# Patient Record
Sex: FEMALE | URBAN - METROPOLITAN AREA
[De-identification: names, ages, dates, MRNs, and addresses within clinical notes are randomized per-mention and may not be internally consistent; named-entity substitution may affect disease eponyms.]

---

## 2017-01-19 ENCOUNTER — APPOINTMENT (RX ONLY)
Dept: URBAN - METROPOLITAN AREA CLINIC 51 | Facility: CLINIC | Age: 37
Setting detail: DERMATOLOGY
End: 2017-01-19

## 2017-01-19 DIAGNOSIS — L71.8 OTHER ROSACEA: ICD-10-CM

## 2017-01-19 DIAGNOSIS — L70.0 ACNE VULGARIS: ICD-10-CM

## 2017-01-19 DIAGNOSIS — B07.8 OTHER VIRAL WARTS: ICD-10-CM

## 2017-01-19 DIAGNOSIS — D485 NEOPLASM OF UNCERTAIN BEHAVIOR OF SKIN: ICD-10-CM

## 2017-01-19 DIAGNOSIS — L72.0 EPIDERMAL CYST: ICD-10-CM

## 2017-01-19 DIAGNOSIS — Z41.9 ENCOUNTER FOR PROCEDURE FOR PURPOSES OTHER THAN REMEDYING HEALTH STATE, UNSPECIFIED: ICD-10-CM

## 2017-01-19 PROBLEM — D48.5 NEOPLASM OF UNCERTAIN BEHAVIOR OF SKIN: Status: ACTIVE | Noted: 2017-01-19

## 2017-01-19 PROCEDURE — 11900 INJECT SKIN LESIONS </W 7: CPT | Mod: 59

## 2017-01-19 PROCEDURE — 17110 DESTRUCTION B9 LES UP TO 14: CPT

## 2017-01-19 PROCEDURE — 99213 OFFICE O/P EST LOW 20 MIN: CPT | Mod: 25

## 2017-01-19 PROCEDURE — ? INTRALESIONAL KENALOG

## 2017-01-19 PROCEDURE — ? CHEMICAL PEEL JESSNER/TCA

## 2017-01-19 PROCEDURE — ? COUNSELING

## 2017-01-19 PROCEDURE — ? BENIGN DESTRUCTION

## 2017-01-19 PROCEDURE — 11311 SHAVE SKIN LESION 0.6-1.0 CM: CPT | Mod: 59

## 2017-01-19 PROCEDURE — ? PRESCRIPTION

## 2017-01-19 PROCEDURE — ? SHAVE REMOVAL

## 2017-01-19 PROCEDURE — ? TREATMENT REGIMEN

## 2017-01-19 RX ORDER — DAPSONE 50 MG/G
GEL TOPICAL
Qty: 1 | Refills: 0

## 2017-01-19 RX ORDER — SULFACETAMIDE SODIUM 98 MG/G
LOTION TOPICAL
Qty: 1 | Refills: 0 | COMMUNITY
Start: 2017-01-19

## 2017-01-19 RX ORDER — SPIRONOLACTONE 50 MG/1
TABLET, FILM COATED ORAL BID
Qty: 60 | Refills: 0 | COMMUNITY
Start: 2017-01-19

## 2017-01-19 RX ORDER — SODIUM SULFACETAMIDE 100 MG/ML
LIQUID TOPICAL
Qty: 1 | Refills: 0 | COMMUNITY
Start: 2017-01-19

## 2017-01-19 RX ADMIN — SPIRONOLACTONE: 50 TABLET, FILM COATED ORAL at 16:15

## 2017-01-19 RX ADMIN — SULFACETAMIDE SODIUM: 98 LOTION TOPICAL at 16:45

## 2017-01-19 RX ADMIN — SODIUM SULFACETAMIDE: 100 LIQUID TOPICAL at 16:37

## 2017-01-19 ASSESSMENT — LOCATION DETAILED DESCRIPTION DERM
LOCATION DETAILED: LEFT SUPERIOR CENTRAL BUCCAL CHEEK
LOCATION DETAILED: RIGHT UPPER CUTANEOUS LIP
LOCATION DETAILED: RIGHT INFERIOR TEMPLE
LOCATION DETAILED: RIGHT SUBMANDIBULAR AREA
LOCATION DETAILED: RIGHT SUPERIOR PREAURICULAR CHEEK

## 2017-01-19 ASSESSMENT — LOCATION ZONE DERM
LOCATION ZONE: FACE
LOCATION ZONE: LIP

## 2017-01-19 ASSESSMENT — LOCATION SIMPLE DESCRIPTION DERM
LOCATION SIMPLE: RIGHT SUBMANDIBULAR AREA
LOCATION SIMPLE: RIGHT TEMPLE
LOCATION SIMPLE: RIGHT CHEEK
LOCATION SIMPLE: LEFT CHEEK
LOCATION SIMPLE: RIGHT LIP

## 2017-01-19 NOTE — PROCEDURE: INTRALESIONAL KENALOG
Ndc# For Kenalog Only: 4023-5993-07
Detail Level: Zone
X Size Of Lesion In Cm (Optional): 0
Total Volume Injected (Ccs- Only Use Numbers And Decimals): 0.2
Administered By (Optional): Dr. Avery Stovall
Lot # (Optional): [id number]
Concentration Of Solution Injected (Mg/Ml): 3.0
Expiration Date (Optional): May 2018
Consent: The risks of atrophy were reviewed with the patient.
Kenalog Preparation: Kenalog

## 2017-01-19 NOTE — PROCEDURE: TREATMENT REGIMEN
Detail Level: Zone
Continue Regimen: Aczone 5% gel QHS
Initiate Treatment: Aldactone 50mg bid\\nSulfacetamide sodium 10% cleanser\\nSulfacetamide sodium 9.8% lotion

## 2017-01-19 NOTE — PROCEDURE: BENIGN DESTRUCTION
Medical Necessity Information: It is in your best interest to select a reason for this procedure from the list below. All of these items fulfill various CMS LCD requirements except the new and changing color options.
Detail Level: Zone
Anesthesia Volume In Cc: 0
Total Number Of Lesions Treated: 2
Medical Necessity Clause: This procedure was medically necessary because the lesions that were treated were:
Consent: The patient's consent was obtained including but not limited to risks of crusting, scabbing, blistering, scarring, darker or lighter pigmentary change, recurrence, incomplete removal and infection.
Post-Care Instructions: I reviewed with the patient in detail post-care instructions. Patient is to wear sunprotection, and avoid picking at any of the treated lesions. Pt may apply Vaseline to crusted or scabbing areas.
Include Z78.9 (Other Specified Conditions Influencing Health Status) As An Associated Diagnosis?: No

## 2017-01-19 NOTE — PROCEDURE: MIPS QUALITY
Quality 110: Preventive Care And Screening: Influenza Immunization: Influenza Immunization Administered during Influenza season
Detail Level: Zone
Quality 226: Preventive Care And Screening: Tobacco Use: Screening And Cessation Intervention: Patient screened for tobacco and never smoked

## 2017-01-19 NOTE — PROCEDURE: SHAVE REMOVAL
Lab Facility: [address] [place] [address]
Lab: [id number] [facility]
Billing Type: [employer]
Notification Instructions: Patient will be notified of biopsy results. However, patient instructed to call the office if not contacted within 2 weeks.
Bill For Surgical Tray: no
Wound Care: Bacitracin
Medical Necessity Information: It is in your best interest to select a reason for this procedure from the list below. All of these items fulfill various CMS LCD requirements except the new and changing color options.
Biopsy Method: 15 blade
Render Post-Care Instructions In Note?: yes
Medical Necessity Clause: This procedure was medically necessary because the lesion that was treated was:
X Size Of Lesion In Cm (Optional): 0
Anesthesia Type: 1% lidocaine with epinephrine
Post-Care Instructions: I reviewed with the patient in detail post-care instructions. Patient is to keep the biopsy site dry overnight, and then apply bacitracin twice daily until healed. Patient may apply hydrogen peroxide soaks to remove any crusting.
Body Location Override (Optional - Billing Will Still Be Based On Selected Body Map Location If Applicable): Right preauricle
Detail Level: Detailed
Hemostasis: Electrocautery
Path Notes (To The Dermatopathologist): R/o:  DN\\n0.7 cm
Anesthesia Volume In Cc: 0,3
Consent was obtained from the patient. The risks and benefits to therapy were discussed in detail. Specifically, the risks of infection, scarring, bleeding, prolonged wound healing, incomplete removal, allergy to anesthesia, nerve injury and recurrence were addressed. Prior to the procedure, the treatment site was clearly identified and confirmed by the patient. All components of Universal Protocol/PAUSE Rule completed.
Size Of Lesion In Cm (Required): 0.7

## 2017-01-19 NOTE — PROCEDURE: CHEMICAL PEEL JESSNER/TCA
Treatment Number: 1
Prep: The treated area was degreased with pre-peel cleanser, and vaseline was applied for protection of mucous membranes.
Erythema: mild
Post-Care Instructions: I reviewed with the patient in detail post-care instructions. Patient should avoid sun exposure and wear sun protection.
Time (Mins): 2
Frost (0,1+,2+,3+,4+): 0
Chemical Peel: 10% TCA
Consent: Prior to the procedure, written consent was obtained and risks were reviewed, including but not limited to: redness, peeling, blistering, pigmentary change, scarring, infection, and pain.
Post Peel Care: After the procedure, the treatment area was washed with soap and water, and a post-peel cream was applied. Sun protection and post-care instructions were reviewed with the patient.
Detail Level: Zone